# Patient Record
Sex: FEMALE | Race: WHITE
[De-identification: names, ages, dates, MRNs, and addresses within clinical notes are randomized per-mention and may not be internally consistent; named-entity substitution may affect disease eponyms.]

---

## 2021-10-27 ENCOUNTER — HOSPITAL ENCOUNTER (OUTPATIENT)
Dept: HOSPITAL 33 - SDC-PAIN | Age: 46
Discharge: HOME | End: 2021-10-27
Attending: PSYCHIATRY & NEUROLOGY
Payer: COMMERCIAL

## 2021-10-27 DIAGNOSIS — F32.9: ICD-10-CM

## 2021-10-27 DIAGNOSIS — N39.0: ICD-10-CM

## 2021-10-27 DIAGNOSIS — J45.909: ICD-10-CM

## 2021-10-27 DIAGNOSIS — Z79.899: ICD-10-CM

## 2021-10-27 DIAGNOSIS — F41.9: ICD-10-CM

## 2021-10-27 DIAGNOSIS — E78.5: ICD-10-CM

## 2021-10-27 DIAGNOSIS — G43.909: ICD-10-CM

## 2021-10-27 DIAGNOSIS — M46.1: Primary | ICD-10-CM

## 2021-10-27 PROCEDURE — 72100 X-RAY EXAM L-S SPINE 2/3 VWS: CPT

## 2021-10-27 PROCEDURE — 77002 NEEDLE LOCALIZATION BY XRAY: CPT

## 2021-10-27 PROCEDURE — 27096 INJECT SACROILIAC JOINT: CPT

## 2021-10-27 PROCEDURE — G0260 INJ FOR SACROILIAC JT ANESTH: HCPCS

## 2021-10-27 NOTE — XRAY
Indication: Left SI joint injection.



Intraoperative fluoroscopy provided for 11 seconds.  2 digital spot images

submitted for interpretation demonstrates posterior needle tip projecting over

the left SI joint.  Correlate with intraoperative findings/report.

## 2021-12-16 ENCOUNTER — HOSPITAL ENCOUNTER (OUTPATIENT)
Dept: HOSPITAL 33 - SDC-PAIN | Age: 46
Discharge: HOME | End: 2021-12-16
Attending: PSYCHIATRY & NEUROLOGY
Payer: COMMERCIAL

## 2021-12-16 DIAGNOSIS — Z79.899: ICD-10-CM

## 2021-12-16 DIAGNOSIS — M54.16: Primary | ICD-10-CM

## 2021-12-16 PROCEDURE — 77002 NEEDLE LOCALIZATION BY XRAY: CPT

## 2021-12-16 PROCEDURE — 72220 X-RAY EXAM SACRUM TAILBONE: CPT

## 2021-12-16 PROCEDURE — 62323 NJX INTERLAMINAR LMBR/SAC: CPT

## 2021-12-16 NOTE — XRAY
Indication: Caudal EUGENIA.



Intraoperative fluoroscopy provided for 18 seconds.  2 digital spot image

submitted for interpretation demonstrates posterior caudal needle tip

projecting mid sacrum.  Small amount of contrast injected for needle tip

placement.  Correlate with intraoperative findings/report.

## 2022-01-05 ENCOUNTER — HOSPITAL ENCOUNTER (OUTPATIENT)
Dept: HOSPITAL 33 - SDC-PAIN | Age: 47
Discharge: HOME | End: 2022-01-05
Attending: PSYCHIATRY & NEUROLOGY
Payer: COMMERCIAL

## 2022-01-05 DIAGNOSIS — Z79.899: ICD-10-CM

## 2022-01-05 DIAGNOSIS — M47.816: Primary | ICD-10-CM

## 2022-01-05 PROCEDURE — 77002 NEEDLE LOCALIZATION BY XRAY: CPT

## 2022-01-05 PROCEDURE — 64494 INJ PARAVERT F JNT L/S 2 LEV: CPT

## 2022-01-05 PROCEDURE — 64493 INJ PARAVERT F JNT L/S 1 LEV: CPT

## 2022-01-05 PROCEDURE — 72020 X-RAY EXAM OF SPINE 1 VIEW: CPT

## 2022-01-05 PROCEDURE — 64495 INJ PARAVERT F JNT L/S 3 LEV: CPT

## 2022-01-05 NOTE — XRAY
Indication: Left L3-S1 MBB.



Intraoperative fluoroscopy provided for 14 seconds.  Single digital spot image

submitted for interpretation demonstrates posterior needle tips projecting

over the expected left L3-S1 nerve roots.  Correlate with intraoperative

findings/report.  Incidental bilateral L3-L5 facet screws.